# Patient Record
(demographics unavailable — no encounter records)

---

## 2025-05-14 NOTE — OB/GYN HISTORY
[Definite:  ___ (Date)] : the last menstrual period was [unfilled] [Currently In Menopause] : currently in menopause [___] : Total Pregnancies: [unfilled]

## 2025-05-14 NOTE — HISTORY OF PRESENT ILLNESS
[FreeTextEntry1] : Jenny Pina is a 55 year old presenting for consult with a Stage IA LEFT breast cancer 2/26/25 MG/us showed a 3.5 cm mass at the 10:00aXIS of the left breast.  Axillary nodes were normal appearing.    3/4/25 US guided core  Biopsy Pathology showed:   4 core biopsies 0.2CM by 1.6 CM-2.4 CM in length IDC nuclear grade 3 ER/[place]+ her2- negative Today 5/14/25 She is feeling well and has healed well after surgery.  She denies any pain or tenderness.  She met with Dr Jeny Washington earlier today and will be starting Anastrozole, Zometa and Kisqali.    MRI of breast at [facility] on 3/21/25  Spiculated enhancing mass and non-mass enhancement in the left breast at 11-12-o'clock corresponding to biopsy proven IDC. Additional adjacent enhancing foci and separate non-mass enhancement in the lower inner left breast, amenable to MRI guided core biopsy Indeterminate enhancement in the right breast at the 12 and 3:00 for which mri guided core biopsy is recommended  MRI guided biopsy RIGHT breast 3/28/25 was benign  Genetic testing was consistant with a VUS  Dr Rich took her to surgery on 4/9/25     Procedure: partial mastectomy and margins   Specimen Laterality: Left Tumor   Tumor Site: Not specified     Histologic Type: Invasive carcinoma of no special type (ductal)   Histologic Grade (Manas Histologic Score):     Glandular (Acinar) / Tubular Differentiation: Score 3     Nuclear Pleomorphism: Score 2     Mitotic Rate: Score 2     Overall Grade: Grade 2 (scores of 6 or 7)   Tumor Size: 36 Millimeters (mm)   Tumor Focality: Single focus of invasive carcinoma   Ductal Carcinoma In Situ (DCIS): Present     Tumor Size: 8 Millimeters (mm)     Number of Blocks with DCIS: 5     Number of Blocks Examined: 33     Architectural Patterns: Comedo;  Cribriform;  Solid     Nuclear Grade: Grade II (intermediate)     Necrosis: Present, central (expansive "comedo" necrosis)   Lobular Carcinoma In Situ (LCIS): Not identified   Lymphovascular Invasion: Present   Dermal Lymphovascular Invasion: No skin present   Microcalcifications: Present in DCIS;  Present in non-neoplastic tissue   Treatment Effect in the Breast: No known presurgical therapy Margins   Margin Status for Invasive Carcinoma: Invasive carcinoma present at margin     Margin(s) Involved by Invasive Carcinoma: Posterior - multifocal     Distance from Invasive Carcinoma to Anterior Margin: Greater than 5 mm     Distance from Invasive Carcinoma to Medial Margin: 10 mm     Distance from Invasive Carcinoma to All Other Margins: Greater than 10 mm   Margin Status for DCIS: All margins negative for DCIS     Distance from DCIS to Closest Margin: Greater than 4 mm     Closest Margin(s) to DCIS: Posterior     Distance from DCIS to Anterior Margin: Greater than 5 mm     Distance from DCIS to All Other Margins: Greater than 10 mm Regional Lymph Nodes   Regional Lymph Node Status: All regional lymph nodes negative for tumor     Molecular Results on Lymph Nodes (used for pN staging): Molecular studies not performed   Total Number of Lymph Nodes Examined (sentinel and non-sentinel): 6   Number of [place] Nodes Examined: 6   pT Category: pT2  pN Category: pN0  ONCOTYPE DX SCORE IS 21 She followed up with Dr Rich on 4/17/25.  She is referred to Dr Washington for AI.  She is doing well today with no complaints.

## 2025-05-14 NOTE — PHYSICAL EXAM
[] : no respiratory distress [Respiration, Rhythm And Depth] : normal respiratory rhythm and effort [Exaggerated Use Of Accessory Muscles For Inspiration] : no accessory muscle use [Abdomen Soft] : soft [Nondistended] : nondistended [Abdomen Tenderness] : non-tender [Axillary Lymph Nodes Enlarged Bilaterally] : axillary [Supraclavicular Lymph Nodes Enlarged Bilaterally] : supraclavicular [Normal] : oriented to person, place and time, the affect was normal, the mood was normal and not anxious [de-identified] : s/p left partial mastectomy; the left breast is smaller than the right and there is a soft tissue defect at the 11-12 o'c position in the location of the tumor bed with some mild dimpling of the skin in that area; no masses or seromas; right breast is unremarkable

## 2025-05-14 NOTE — HISTORY OF PRESENT ILLNESS
[FreeTextEntry1] : Jenny Pina is a 55 year old presenting for consult with a Stage IA LEFT breast cancer 2/26/25 MG/us showed a 3.5 cm mass at the 10:00aXIS of the left breast.  Axillary nodes were normal appearing.    3/4/25 US guided core  Biopsy Pathology showed:   4 core biopsies 0.2CM by 1.6 CM-2.4 CM in length IDC nuclear grade 3 ER/[place]+ her2- negative Today 5/14/25 She is feeling well and has healed well after surgery.  She denies any pain or tenderness.  She met with Dr Jeny Washington earlier today and will be starting Anastrozole, Zometa and Kisqali.    MRI of breast at [facility] on 3/21/25  Spiculated enhancing mass and non-mass enhancement in the left breast at 11-12-o'clock corresponding to biopsy proven IDC. Additional adjacent enhancing foci and separate non-mass enhancement in the lower inner left breast, amenable to MRI guided core biopsy Indeterminate enhancement in the right breast at the 12 and 3:00 for which mri guided core biopsy is recommended  MRI guided biopsy RIGHT breast 3/28/25 was benign  Genetic testing was consistant with a VUS  Dr Rich took her to surgery on 4/9/25     Procedure: partial mastectomy and margins   Specimen Laterality: Left Tumor   Tumor Site: Not specified     Histologic Type: Invasive carcinoma of no special type (ductal)   Histologic Grade (Manas Histologic Score):     Glandular (Acinar) / Tubular Differentiation: Score 3     Nuclear Pleomorphism: Score 2     Mitotic Rate: Score 2     Overall Grade: Grade 2 (scores of 6 or 7)   Tumor Size: 36 Millimeters (mm)   Tumor Focality: Single focus of invasive carcinoma   Ductal Carcinoma In Situ (DCIS): Present     Tumor Size: 8 Millimeters (mm)     Number of Blocks with DCIS: 5     Number of Blocks Examined: 33     Architectural Patterns: Comedo;  Cribriform;  Solid     Nuclear Grade: Grade II (intermediate)     Necrosis: Present, central (expansive "comedo" necrosis)   Lobular Carcinoma In Situ (LCIS): Not identified   Lymphovascular Invasion: Present   Dermal Lymphovascular Invasion: No skin present   Microcalcifications: Present in DCIS;  Present in non-neoplastic tissue   Treatment Effect in the Breast: No known presurgical therapy Margins   Margin Status for Invasive Carcinoma: Invasive carcinoma present at margin     Margin(s) Involved by Invasive Carcinoma: Posterior - multifocal     Distance from Invasive Carcinoma to Anterior Margin: Greater than 5 mm     Distance from Invasive Carcinoma to Medial Margin: 10 mm     Distance from Invasive Carcinoma to All Other Margins: Greater than 10 mm   Margin Status for DCIS: All margins negative for DCIS     Distance from DCIS to Closest Margin: Greater than 4 mm     Closest Margin(s) to DCIS: Posterior     Distance from DCIS to Anterior Margin: Greater than 5 mm     Distance from DCIS to All Other Margins: Greater than 10 mm Regional Lymph Nodes   Regional Lymph Node Status: All regional lymph nodes negative for tumor     Molecular Results on Lymph Nodes (used for pN staging): Molecular studies not performed   Total Number of Lymph Nodes Examined (sentinel and non-sentinel): 6   Number of [place] Nodes Examined: 6   pT Category: pT2  pN Category: pN0  ONCOTYPE DX SCORE IS 21 She followed up with Dr Rich on 4/17/25.  She is referred to Dr Washington for AI.  She is doing well today with no complaints.

## 2025-05-14 NOTE — PHYSICAL EXAM
[] : no respiratory distress [Respiration, Rhythm And Depth] : normal respiratory rhythm and effort [Exaggerated Use Of Accessory Muscles For Inspiration] : no accessory muscle use [Abdomen Soft] : soft [Nondistended] : nondistended [Abdomen Tenderness] : non-tender [Axillary Lymph Nodes Enlarged Bilaterally] : axillary [Supraclavicular Lymph Nodes Enlarged Bilaterally] : supraclavicular [Normal] : oriented to person, place and time, the affect was normal, the mood was normal and not anxious [de-identified] : s/p left partial mastectomy; the left breast is smaller than the right and there is a soft tissue defect at the 11-12 o'c position in the location of the tumor bed with some mild dimpling of the skin in that area; no masses or seromas; right breast is unremarkable

## 2025-06-11 NOTE — DISEASE MANAGEMENT
[TTNM] : 2 [NTNM] : 0 [MTNM] : - [de-identified] : 1069 [[place] [id number]] : [id number] [de-identified] : Left breast

## 2025-06-11 NOTE — DISEASE MANAGEMENT
[TTNM] : 2 [NTNM] : 0 [MTNM] : - [de-identified] : 1064 [[place] [id number]] : 8115 [de-identified] : Left breast

## 2025-06-11 NOTE — HISTORY OF PRESENT ILLNESS
[FreeTextEntry1] : 6/10/25 FX 4 doing well with treatment. She is using Biofine lotion as directed.  No redness or irritation is noted to the treatment area.

## 2025-06-24 NOTE — REVIEW OF SYSTEMS
[Fatigue: Grade 1 - Fatigue relieved by rest] : Fatigue: Grade 1 - Fatigue relieved by rest [Breast Pain: Grade 0] : Breast Pain: Grade 0 [Pruritus: Grade 1 - Mild or localized; topical intervention indicated] : Pruritus: Grade 1 - Mild or localized; topical intervention indicated [Skin Hyperpigmentation: Grade 0] : Skin Hyperpigmentation: Grade 0 [Dermatitis Radiation: Grade 0] : Dermatitis Radiation: Grade 0

## 2025-06-25 NOTE — HISTORY OF PRESENT ILLNESS
[FreeTextEntry1] : 6/10/25 FX 4 doing well with treatment. She is using Biofine lotion as directed.  No redness or irritation is noted to the treatment area.    6/17/2025 Ms. Pina presents today for her OTV.  She completed 9/20 fractions to the left breast to a dose of 2385 cGy. She is using Calendula cream BID. She has no skin irritation at this time. She denies any pain or itching.   6/24/25 FX 14 Using Calendula lotion as directed.  Denies redness or irritation to the site.  She notes some slight itching but it is not bothersome

## 2025-06-25 NOTE — PHYSICAL EXAM
[] : no respiratory distress [Respiration, Rhythm And Depth] : normal respiratory rhythm and effort [Exaggerated Use Of Accessory Muscles For Inspiration] : no accessory muscle use [Nondistended] : nondistended [Normal] : oriented to person, place and time, the affect was normal, the mood was normal and not anxious [de-identified] : faint erythema left breast [de-identified] : as above

## 2025-06-25 NOTE — PHYSICAL EXAM
[] : no respiratory distress [Respiration, Rhythm And Depth] : normal respiratory rhythm and effort [Exaggerated Use Of Accessory Muscles For Inspiration] : no accessory muscle use [Nondistended] : nondistended [Normal] : oriented to person, place and time, the affect was normal, the mood was normal and not anxious [de-identified] : faint erythema left breast [de-identified] : as above

## 2025-06-25 NOTE — DISEASE MANAGEMENT
[Pathological] : TNM Stage: p [I] : I [TTNM] : 2 [NTNM] : 0 [MTNM] : - [de-identified] : 9420 [de-identified] : 0319 [de-identified] : Left breast

## 2025-06-25 NOTE — DISEASE MANAGEMENT
[Pathological] : TNM Stage: p [I] : I [TTNM] : 2 [NTNM] : 0 [MTNM] : - [de-identified] : 6824 [de-identified] : 6233 [de-identified] : Left breast

## 2025-07-09 NOTE — PHYSICAL EXAM
[] : no respiratory distress [Exaggerated Use Of Accessory Muscles For Inspiration] : no accessory muscle use [Skin Lesions] : no skin lesions [Normal] : oriented to person, place and time, the affect was normal, the mood was normal and not anxious [de-identified] : faint erythema w/o edema or desquamation left breast

## 2025-07-09 NOTE — DISEASE MANAGEMENT
[Pathological] : TNM Stage: p [I] : I [TTNM] : 2 [NTNM] : 0 [MTNM] : - [de-identified] : [id number] [de-identified] : 1913 [de-identified] : Left breast

## 2025-07-09 NOTE — HISTORY OF PRESENT ILLNESS
[FreeTextEntry1] : 6/10/25 FX 4 doing well with treatment. She is using Biofine lotion as directed.  No redness or irritation is noted to the treatment area.    6/17/2025 Ms. Pina presents today for her OTV.  She completed 9/20 fractions to the left breast to a dose of 2385 cGy. She is using Calendula cream BID. She has no skin irritation at this time. She denies any pain or itching.   6/24/25 FX 14 Using Calendula lotion as directed.  Denies redness or irritation to the site.  She notes some slight itching but it is not bothersome  6/30/25 Boost 2/4 Still using Calendula lotion.  No redness irritation or discomfort noted.